# Patient Record
(demographics unavailable — no encounter records)

---

## 2024-11-20 NOTE — ASSESSMENT
[Long Term Vascular Complications] : long term vascular complications of diabetes [Carbohydrate Consistent Diet] : carbohydrate consistent diet [Importance of Diet and Exercise] : importance of diet and exercise to improve glycemic control, achieve weight loss and improve cardiovascular health [Weight Loss] : weight loss [FreeTextEntry1] : 1) 1) Obesity, Morbid: Class III, complicated by prediabetes. High risk of metabolic syndrome and future complications. Discussed options including meds, bariatric surgery and lifestyle modification. RB and alternatives discussed. Questions answered and she verbalized understanding. has successfully lost weight.     3) Elevated BP. Reassess microalbumin prior to the NV. resolved w/ targeted weight loss    4) Hypothyroidism:  Appears clinically euthyroid at this time on 75 mcg of LT4 (taking med appropriately). Reassess TFTs and need for medication titration at this time. Reviewed importance of compliance and to alert us if plans for pregnancy change in order to adjust dose and increase monitoring.